# Patient Record
Sex: MALE | Race: OTHER | HISPANIC OR LATINO | Employment: FULL TIME | URBAN - METROPOLITAN AREA
[De-identification: names, ages, dates, MRNs, and addresses within clinical notes are randomized per-mention and may not be internally consistent; named-entity substitution may affect disease eponyms.]

---

## 2017-05-02 ENCOUNTER — GENERIC CONVERSION - ENCOUNTER (OUTPATIENT)
Dept: OTHER | Facility: OTHER | Age: 37
End: 2017-05-02

## 2017-10-03 ENCOUNTER — LAB REQUISITION (OUTPATIENT)
Dept: LAB | Facility: HOSPITAL | Age: 37
End: 2017-10-03
Payer: COMMERCIAL

## 2017-10-03 DIAGNOSIS — R10.9 ABDOMINAL PAIN: ICD-10-CM

## 2017-10-03 PROCEDURE — 83516 IMMUNOASSAY NONANTIBODY: CPT | Performed by: INTERNAL MEDICINE

## 2017-10-05 LAB
TTG IGA SER-ACNC: <2 U/ML (ref 0–3)
TTG IGG SER-ACNC: <2 U/ML (ref 0–5)

## 2017-10-06 ENCOUNTER — GENERIC CONVERSION - ENCOUNTER (OUTPATIENT)
Dept: OTHER | Facility: OTHER | Age: 37
End: 2017-10-06

## 2017-11-01 ENCOUNTER — APPOINTMENT (EMERGENCY)
Dept: RADIOLOGY | Facility: HOSPITAL | Age: 37
End: 2017-11-01
Payer: COMMERCIAL

## 2017-11-01 ENCOUNTER — HOSPITAL ENCOUNTER (EMERGENCY)
Facility: HOSPITAL | Age: 37
Discharge: HOME | End: 2017-11-01
Attending: EMERGENCY MEDICINE | Admitting: EMERGENCY MEDICINE
Payer: COMMERCIAL

## 2017-11-01 VITALS
WEIGHT: 230 LBS | BODY MASS INDEX: 38.32 KG/M2 | OXYGEN SATURATION: 99 % | HEART RATE: 66 BPM | SYSTOLIC BLOOD PRESSURE: 121 MMHG | HEIGHT: 65 IN | DIASTOLIC BLOOD PRESSURE: 72 MMHG | RESPIRATION RATE: 20 BRPM | TEMPERATURE: 98.1 F

## 2017-11-01 DIAGNOSIS — R11.0 NAUSEA: ICD-10-CM

## 2017-11-01 DIAGNOSIS — R10.9 BELLY PAIN: ICD-10-CM

## 2017-11-01 DIAGNOSIS — K85.90 ACUTE PANCREATITIS: Primary | ICD-10-CM

## 2017-11-01 LAB
ALBUMIN SERPL BCP-MCNC: 4 G/DL (ref 3.5–5)
ALP SERPL-CCNC: 46 U/L (ref 46–116)
ALT SERPL W P-5'-P-CCNC: 33 U/L (ref 12–78)
ANION GAP SERPL CALCULATED.3IONS-SCNC: 6 MMOL/L (ref 4–13)
AST SERPL W P-5'-P-CCNC: 27 U/L (ref 5–45)
BASOPHILS # BLD AUTO: 0.01 THOUSANDS/ΜL (ref 0–0.1)
BASOPHILS NFR BLD AUTO: 0 % (ref 0–1)
BILIRUB SERPL-MCNC: 0.4 MG/DL (ref 0.2–1)
BUN SERPL-MCNC: 14 MG/DL (ref 5–25)
CALCIUM SERPL-MCNC: 8.8 MG/DL (ref 8.3–10.1)
CHLORIDE SERPL-SCNC: 106 MMOL/L (ref 100–108)
CO2 SERPL-SCNC: 29 MMOL/L (ref 21–32)
CREAT SERPL-MCNC: 1.02 MG/DL (ref 0.6–1.3)
EOSINOPHIL # BLD AUTO: 0.14 THOUSAND/ΜL (ref 0–0.61)
EOSINOPHIL NFR BLD AUTO: 2 % (ref 0–6)
ERYTHROCYTE [DISTWIDTH] IN BLOOD BY AUTOMATED COUNT: 12.4 % (ref 11.6–15.1)
GFR SERPL CREATININE-BSD FRML MDRD: 94 ML/MIN/1.73SQ M
GLUCOSE SERPL-MCNC: 100 MG/DL (ref 65–140)
HCT VFR BLD AUTO: 41 % (ref 36.5–49.3)
HGB BLD-MCNC: 14 G/DL (ref 12–17)
HOLD SPECIMEN: NORMAL
LIPASE SERPL-CCNC: 1278 U/L (ref 73–393)
LYMPHOCYTES # BLD AUTO: 1.37 THOUSANDS/ΜL (ref 0.6–4.47)
LYMPHOCYTES NFR BLD AUTO: 23 % (ref 14–44)
MCH RBC QN AUTO: 30.4 PG (ref 26.8–34.3)
MCHC RBC AUTO-ENTMCNC: 34.1 G/DL (ref 31.4–37.4)
MCV RBC AUTO: 89 FL (ref 82–98)
MONOCYTES # BLD AUTO: 0.51 THOUSAND/ΜL (ref 0.17–1.22)
MONOCYTES NFR BLD AUTO: 8 % (ref 4–12)
NEUTROPHILS # BLD AUTO: 4.04 THOUSANDS/ΜL (ref 1.85–7.62)
NEUTS SEG NFR BLD AUTO: 67 % (ref 43–75)
NRBC BLD AUTO-RTO: 0 /100 WBCS
PLATELET # BLD AUTO: 211 THOUSANDS/UL (ref 149–390)
PMV BLD AUTO: 9.5 FL (ref 8.9–12.7)
POTASSIUM SERPL-SCNC: 3.3 MMOL/L (ref 3.5–5.3)
PROT SERPL-MCNC: 7.7 G/DL (ref 6.4–8.2)
RBC # BLD AUTO: 4.6 MILLION/UL (ref 3.88–5.62)
SODIUM SERPL-SCNC: 141 MMOL/L (ref 136–145)
WBC # BLD AUTO: 6.08 THOUSAND/UL (ref 4.31–10.16)

## 2017-11-01 PROCEDURE — 80053 COMPREHEN METABOLIC PANEL: CPT | Performed by: EMERGENCY MEDICINE

## 2017-11-01 PROCEDURE — 83690 ASSAY OF LIPASE: CPT | Performed by: EMERGENCY MEDICINE

## 2017-11-01 PROCEDURE — 99284 EMERGENCY DEPT VISIT MOD MDM: CPT

## 2017-11-01 PROCEDURE — 36415 COLL VENOUS BLD VENIPUNCTURE: CPT

## 2017-11-01 PROCEDURE — 96375 TX/PRO/DX INJ NEW DRUG ADDON: CPT

## 2017-11-01 PROCEDURE — 96374 THER/PROPH/DIAG INJ IV PUSH: CPT

## 2017-11-01 PROCEDURE — 85025 COMPLETE CBC W/AUTO DIFF WBC: CPT | Performed by: EMERGENCY MEDICINE

## 2017-11-01 PROCEDURE — 74177 CT ABD & PELVIS W/CONTRAST: CPT

## 2017-11-01 PROCEDURE — 96361 HYDRATE IV INFUSION ADD-ON: CPT

## 2017-11-01 RX ORDER — ACETAMINOPHEN 325 MG/1
650 TABLET ORAL EVERY 6 HOURS PRN
Qty: 30 TABLET | Refills: 0 | Status: ON HOLD | OUTPATIENT
Start: 2017-11-01 | End: 2019-02-25

## 2017-11-01 RX ORDER — KETOROLAC TROMETHAMINE 30 MG/ML
15 INJECTION, SOLUTION INTRAMUSCULAR; INTRAVENOUS ONCE
Status: COMPLETED | OUTPATIENT
Start: 2017-11-01 | End: 2017-11-01

## 2017-11-01 RX ORDER — IBUPROFEN 400 MG/1
400 TABLET ORAL EVERY 6 HOURS PRN
Qty: 30 TABLET | Refills: 0 | Status: SHIPPED | OUTPATIENT
Start: 2017-11-01 | End: 2017-11-08

## 2017-11-01 RX ORDER — ONDANSETRON 4 MG/1
4 TABLET, FILM COATED ORAL EVERY 8 HOURS PRN
Qty: 20 TABLET | Refills: 0 | Status: SHIPPED | OUTPATIENT
Start: 2017-11-01 | End: 2019-11-25 | Stop reason: ALTCHOICE

## 2017-11-01 RX ORDER — MORPHINE SULFATE 10 MG/ML
10 INJECTION, SOLUTION INTRAMUSCULAR; INTRAVENOUS ONCE
Status: COMPLETED | OUTPATIENT
Start: 2017-11-01 | End: 2017-11-01

## 2017-11-01 RX ORDER — MORPHINE SULFATE 15 MG/1
7.5 TABLET ORAL EVERY 6 HOURS PRN
Qty: 10 TABLET | Refills: 0 | Status: SHIPPED | OUTPATIENT
Start: 2017-11-01 | End: 2017-11-03

## 2017-11-01 RX ORDER — METOCLOPRAMIDE HYDROCHLORIDE 5 MG/ML
10 INJECTION INTRAMUSCULAR; INTRAVENOUS ONCE
Status: COMPLETED | OUTPATIENT
Start: 2017-11-01 | End: 2017-11-01

## 2017-11-01 RX ADMIN — SODIUM CHLORIDE 1000 ML: 0.9 INJECTION, SOLUTION INTRAVENOUS at 13:06

## 2017-11-01 RX ADMIN — IOHEXOL 100 ML: 350 INJECTION, SOLUTION INTRAVENOUS at 13:52

## 2017-11-01 RX ADMIN — KETOROLAC TROMETHAMINE 15 MG: 30 INJECTION, SOLUTION INTRAMUSCULAR at 13:10

## 2017-11-01 RX ADMIN — MORPHINE SULFATE 10 MG: 10 INJECTION, SOLUTION INTRAMUSCULAR; INTRAVENOUS at 13:11

## 2017-11-01 RX ADMIN — METOCLOPRAMIDE 10 MG: 5 INJECTION, SOLUTION INTRAMUSCULAR; INTRAVENOUS at 13:07

## 2017-11-01 NOTE — ED PROVIDER NOTES
Final Diagnosis:  1  Acute pancreatitis    2  Belly pain    3  Nausea      Chief Complaint   Patient presents with    Abdominal Pain     Pt c/o abdominal pain and nausea especially for the past 4 days  This is a 40year old male presenting for abdominal pain  For the past 10 years he would have these intermittent episodes of severe abdominal pain that seems "uncomfortable" or "sharp"  It would last sometimes for hours, othertimes days and then resolve  It's associated with nausea  Because of worsening symptoms he eventually followed up with GI who told him likely this represents IBS more than anything else  In the past 4 days though he feels his symptoms are different, much more severe associated with nausea without vomiting  Because of the severity he decided to come in for an evaluation  Denies f/ch   +n without V  Denies CP/SOB  Denies any upper respiratory tract infection symptoms (cough, congestion, rhinorrhea, sore throat)  Denies any urinary tract infection symptoms (burning, itching, pain, blood, frequency)  Denies sick contacts  If it weren't for the severity of nausea with the pain now wrapping around the upper abdomen towards the back he would not be here for an evaluation  No new foods  +food related symptoms   Denies diarrhea, constipation  PMH:  - IBS on no meds besides bentyl PRN  PSH:  - none  Former smoker  No alcohol/drugs  PE:   Vitals:    11/01/17 1046 11/01/17 1125 11/01/17 1315 11/01/17 1403   BP: 159/87 (!) 172/102 138/73 123/68   Pulse: 96 (!) 115 66 64   Resp: 20 (!) 24 20 20   Temp: 98 °F (36 7 °C) 98 1 °F (36 7 °C)     TempSrc: Tympanic Oral     SpO2: 100% 95% 100% 99%   Weight: 76 7 kg (169 lb) 104 kg (230 lb)     Height: 5' 8 75" (1 746 m) 5' 5" (1 651 m)     General: VSS, NAD, awake, alert  Well-nourished, well-developed  Appears stated age  Head: Normocephalic, atraumatic, nontender  Eyes: PERRL, EOM-I  No diplopia  No hyphema     No subconjunctival hemorrhages  Symmetrical lids  ENT: Atraumatic external nose and ears  MMM  No malocclusion  No stridor  Normal phonation  No drooling  Normal swallowing  Neck: Symmetric, trachea midline  No JVD  CV: RRR  +S1/S2  No murmurs or gallops  Peripheral pulses +2 throughout  No chest wall tenderness  Lungs:   Unlabored No retractions  CTAB, lungs sounds equal bilateral    No crepitus  No tachypnea  No paradoxical motion  Abd: +BS, soft  NT abdomen entirely including the RUQ  When I went to evaluate the RUQ for a Ann's sign though  The patient had a very very positive Ann's and started to cry asking "what the hell was that"  ND  No guarding until after Ann's, then guarding RUQ  No rigidity  No rebound  No hepatosplenomegaly noted  No peritoneal signs  Psoas/obturator/heel strike signs are absent  MSK:   FROM   No lower extremity edema  Back:   No CVAT  Skin: Dry, intact  Neuro: AAOx3, GCS 15, CN II-XII grossly intact  Motor grossly intact  Psychiatric/Behavioral: Appropriate mood and affect   Exam: deferred  A:  - Positive Ann's  - Lipase elevation  - Belly pain  - Nausea  P:  - Discussed that I think he has pancreatitis but given the RUQ/Ann's is this gallstone pancreatitis? I want to do a CT scan for his severity of symptoms  - CT given the higher sensitivity for cholecystitis than US (96% vs 94%)  Will also evaluate for pancreatitis severity  - Will do pain control, nausea control, re-evaluate  - I discussed that if he is relatively pain free and normal GB, I'd be okay with DC home and NPO --> CLD --> supportive care  - 13 point ROS was performed and all are normal unless stated in the history above  - Nursing note reviewed  Vitals reviewed  - Orders placed by myself and/or advanced practitioner / resident     - Previous chart was reviewed  - No language barrier    - History obtained from patient  - There are no limitations to the history obtained     - Critical care time: Not applicable for this patient  ED Course as of Nov 01 1504   Wed Nov 01, 2017   1235 Lipase: (!) 1,278   1446 Potassium: (!) 3 3   1446 CT is benign without any GB findings or marked pancreatic abnormalities despite lipase elevation  Will discuss DC vs  Admission for NPO/fluids vs  NPO/CLD/regular diet over next few days  Whatever patient prefers  1502 I reviewed all testing with the patient  I gave oral return precautions for what to return for in addition to the written return precautions  The patient (and any family present) verbalized understanding of the discharge instructions and warnings that would necessitate return to the Emergency Department  I specifically highlighted areas of special concern regarding the written and verbal discharge instructions and return precautions  All questions were answered prior to discharge  Medications   morphine (PF) 10 mg/mL injection 10 mg (10 mg Intravenous Given 11/1/17 1311)   ketorolac (TORADOL) 30 mg/mL injection 15 mg (15 mg Intravenous Given 11/1/17 1310)   sodium chloride 0 9 % bolus 1,000 mL (1,000 mL Intravenous New Bag 11/1/17 1306)   metoclopramide (REGLAN) injection 10 mg (10 mg Intravenous Given 11/1/17 1307)   iohexol (OMNIPAQUE) 350 MG/ML injection (MULTI-DOSE) 100 mL (100 mL Intravenous Given 11/1/17 1352)     CT abdomen pelvis with contrast   ED Interpretation   CT ordered by myself and the report from radiologist has been reviewed  Final Result      No acute abnormality evident  Pancreas appears within normal limits  Subcentimeter hypoattenuating nodule within the liver statistically most likely representing benign finding           Workstation performed: [id number]           Orders Placed This Encounter   Procedures    CT abdomen pelvis with contrast    CBC and differential    Comprehensive metabolic panel    Lipase    POCT urinalysis dipstick    Insert peripheral IV     Labs Reviewed   COMPREHENSIVE METABOLIC PANEL - Abnormal        Result Value Ref Range Status    Potassium 3 3 (*) 3 5 - 5 3 mmol/L Final    Sodium 141  136 - 145 mmol/L Final    Chloride 106  100 - 108 mmol/L Final    CO2 29  21 - 32 mmol/L Final    Anion Gap 6  4 - 13 mmol/L Final    BUN 14  5 - 25 mg/dL Final    Creatinine 1 02  0 60 - 1 30 mg/dL Final    Comment: Standardized to IDMS reference method    Glucose 100  65 - 140 mg/dL Final    Comment:   If the patient is fasting, the ADA then defines impaired fasting glucose as > 100 mg/dL and diabetes as > or equal to 123 mg/dL  Specimen collection should occur prior to Sulfasalazine administration due to the potential for falsely depressed results  Specimen collection should occur prior to Sulfapyridine administration due to the potential for falsely elevated results  Calcium 8 8  8 3 - 10 1 mg/dL Final    AST 27  5 - 45 U/L Final    Comment:   Specimen collection should occur prior to Sulfasalazine administration due to the potential for falsely depressed results  ALT 33  12 - 78 U/L Final    Comment:   Specimen collection should occur prior to Sulfasalazine and/or Sulfapyridine administration due to the potential for falsely depressed results  Alkaline Phosphatase 46  46 - 116 U/L Final    Total Protein 7 7  6 4 - 8 2 g/dL Final    Albumin 4 0  3 5 - 5 0 g/dL Final    Total Bilirubin 0 40  0 20 - 1 00 mg/dL Final    eGFR 94  ml/min/1 73sq m Final    Narrative:     National Kidney Disease Education Program recommendations are as follows:  GFR calculation is accurate only with a steady state creatinine  Chronic Kidney disease less than 60 ml/min/1 73 sq  meters  Kidney failure less than 15 ml/min/1 73 sq  meters     LIPASE - Abnormal     Lipase 1,278 (*) 73 - 393 u/L Final   CBC AND DIFFERENTIAL - Normal    WBC 6 08  4 31 - 10 16 Thousand/uL Final    RBC 4 60  3 88 - 5 62 Million/uL Final    Hemoglobin 14 0  12 0 - 17 0 g/dL Final    Hematocrit 41 0  36 5 - 49 3 % Final    MCV 89 82 - 98 fL Final    MCH 30 4  26 8 - 34 3 pg Final    MCHC 34 1  31 4 - 37 4 g/dL Final    RDW 12 4  11 6 - 15 1 % Final    MPV 9 5  8 9 - 12 7 fL Final    Platelets 785  059 - 390 Thousands/uL Final    nRBC 0  /100 WBCs Final    Neutrophils Relative 67  43 - 75 % Final    Lymphocytes Relative 23  14 - 44 % Final    Monocytes Relative 8  4 - 12 % Final    Eosinophils Relative 2  0 - 6 % Final    Basophils Relative 0  0 - 1 % Final    Neutrophils Absolute 4 04  1 85 - 7 62 Thousands/µL Final    Lymphocytes Absolute 1 37  0 60 - 4 47 Thousands/µL Final    Monocytes Absolute 0 51  0 17 - 1 22 Thousand/µL Final    Eosinophils Absolute 0 14  0 00 - 0 61 Thousand/µL Final    Basophils Absolute 0 01  0 00 - 0 10 Thousands/µL Final   POCT URINALYSIS DIPSTICK   LIGHT BLUE TOP   LAVENDER TOP 7 ML ON HOLD    Extra Tube Hold for add-ons  Final    Comment: Auto resulted  GOLD TOP ON HOLD     Time reflects when diagnosis was documented in both MDM as applicable and the Disposition within this note     Time User Action Codes Description Comment    11/1/2017  2:59 PM [place] Jessica Add [K85 90] Acute pancreatitis     11/1/2017  2:59 PM Jamal Jessica Add [R10 9] Belly pain     11/1/2017  2:59 PM [place] Jessica Add [R11 0] Nausea       ED Disposition     ED Disposition Condition Comment    Discharge  Sierra López discharge to home/self care      Condition at discharge: Good        Follow-up Information     Follow up With Specialties Details Why Contact Info Additional [address] Emergency Department Emergency Medicine Go to If symptoms worsen [address]  [phone]  ED, [address] [place] [address], [address] Shay Aponte MD Family Medicine Call in 1 day To make appointment for re-evaluation in 1 week [address]  [phone]           Patient's Medications   Discharge Prescriptions    ACETAMINOPHEN (TYLENOL) 325 MG TABLET    Take 2 tablets by mouth every 6 (six) hours as needed for mild pain or fever       Start Date: 11/1/2017 End Date: --       Order Dose: 650 mg       Quantity: 30 tablet    Refills: 0    IBUPROFEN (MOTRIN) 400 MG TABLET    Take 1 tablet by mouth every 6 (six) hours as needed for mild pain for up to 7 days       Start Date: 11/1/2017 End Date: 11/8/2017       Order Dose: 400 mg       Quantity: 30 tablet    Refills: 0    MORPHINE (MSIR) 15 MG TABLET    Take 0 5 tablets by mouth every 6 (six) hours as needed for severe pain (No driving while using ) for up to 2 days Max Daily Amount: 30 mg       Start Date: 11/1/2017 End Date: 11/3/2017       Order Dose: 7 5 mg       Quantity: 10 tablet    Refills: 0    ONDANSETRON (ZOFRAN) 4 MG TABLET    Take 1 tablet by mouth every 8 (eight) hours as needed for nausea or vomiting       Start Date: 11/1/2017 End Date: --       Order Dose: 4 mg       Quantity: 20 tablet    Refills: 0     No discharge procedures on file  None       Portions of the record may have been created with voice recognition software  Occasional wrong word or "sound a like" substitutions may have occurred due to the inherent limitations of voice recognition software  Read the chart carefully and recognize, using context, where substitutions have occurred      Electronically signed by:  Shelah Leyden, MD  11/01/17 7808

## 2017-11-01 NOTE — DISCHARGE INSTRUCTIONS
Please follow-up with your GI doctor as we discussed  Please call your PCP to get re-evaluate in 2-4 days  If you have significant worsening of symptoms though, please come back to the ER for ree-valuation    I would be without food for today (NPO)  Tomorrow, advanced your diet to just clear liquid diet  The day after, advance to normal diet    Use tylenol/motrin for pain  Use zofran for nausea  Morphine for break through pain  No driving while using morphine  Pancreatitis   WHAT YOU NEED TO KNOW:   Pancreatitis is inflammation of your pancreas  The pancreas is an organ that makes insulin  It also makes enzymes (digestive juices) that help your body digest food  Pancreatitis may be an acute (short-term) problem that happens only once  It may become a chronic (long-term) problem that comes and goes over time  DISCHARGE INSTRUCTIONS:   Return to the emergency department if:   · You have severe pain in your abdomen and you are vomiting  Contact your healthcare provider if:   · You have a fever  · You continue to lose weight  · Your skin or the whites of your eyes turn yellow  · You have questions or concerns about your condition or care  Medicines: You may need any of the following:  · Antibiotics  treat a bacterial infection  · Prescription pain medicine  may be given  Ask your healthcare provider how to take this medicine safely  Some prescription pain medicines contain acetaminophen  Do not take other medicines that contain acetaminophen without talking to your healthcare provider  Too much acetaminophen may cause liver damage  Prescription pain medicine may cause constipation  Ask your healthcare provider how to prevent or treat constipation  · Take your medicine as directed  Contact your healthcare provider if you think your medicine is not helping or if you have side effects  Tell him or her if you are allergic to any medicine   Keep a list of the medicines, vitamins, and herbs you take  Include the amounts, and when and why you take them  Bring the list or the pill bottles to follow-up visits  Carry your medicine list with you in case of an emergency  Self-care:   · Rest  when you feel it is needed  Slowly start to do more each day  Return to your usual activities as directed  · Do not drink any alcohol  If you need help to stop drinking, contact the following organization:   [employer]  Web Address: http://[employer]/      · Ask your healthcare provider or dietitian about the best foods to eat  You may need to eat foods that are low in fat if you have chronic pancreatitis  Follow up with your healthcare provider as directed:  Write down your questions so you remember to ask them during your visits  © 2017 [address] Information is for End User's use only and may not be sold, redistributed or otherwise used for commercial purposes  All illustrations and images included in CareNotes® are the copyrighted property of A D A M , Inc  or Ravi Rocha  The above information is an  only  It is not intended as medical advice for individual conditions or treatments  Talk to your doctor, nurse or pharmacist before following any medical regimen to see if it is safe and effective for you  Acute Nausea and Vomiting   WHAT YOU NEED TO KNOW:   Acute nausea and vomiting start suddenly, worsen quickly, and last a short time  DISCHARGE INSTRUCTIONS:   Return to the emergency department if:   · You see blood in your vomit or your bowel movements  · You have sudden, severe pain in your chest and upper abdomen after hard vomiting or retching  · You have swelling in your neck and chest      · You are dizzy, cold, and thirsty and your eyes and mouth are dry  · You are urinating very little or not at all  · You have muscle weakness, leg cramps, and trouble breathing       · Your heart is beating much faster than normal  · You continue to vomit for more than 48 hours  Contact your healthcare provider if:   · You have frequent dry heaves (vomiting but nothing comes out)  · Your nausea and vomiting does not get better or go away after you use medicine  · You have questions or concerns about your condition or treatment  Medicines: You may need any of the following:  · Medicines  may be given to calm your stomach and stop your vomiting  You may also need medicines to help you feel more relaxed or to stop nausea and vomiting caused by motion sickness  · Gastrointestinal stimulants  are used to help empty your stomach and bowels  This may help decrease nausea and vomiting  · Take your medicine as directed  Contact your healthcare provider if you think your medicine is not helping or if you have side effects  Tell him or her if you are allergic to any medicine  Keep a list of the medicines, vitamins, and herbs you take  Include the amounts, and when and why you take them  Bring the list or the pill bottles to follow-up visits  Carry your medicine list with you in case of an emergency  Prevent or manage acute nausea and vomiting:   · Do not drink alcohol  Alcohol may upset or irritate your stomach  Too much alcohol can also cause acute nausea and vomiting  · Control stress  Headaches due to stress may cause nausea and vomiting  Find ways to relax and manage your stress  Get more rest and sleep  · Drink more liquids as directed  Vomiting can lead to dehydration  It is important to drink more liquids to help replace lost body fluids  Ask your healthcare provider how much liquid to drink each day and which liquids are best for you  Your provider may recommend that you drink an oral rehydration solution (ORS)  ORS contains water, salts, and sugar that are needed to replace the lost body fluids  Ask what kind of ORS to use, how much to drink, and where to get it  · Eat smaller meals, more often    Eat small amounts of food every 2 to 3 hours, even if you are not hungry  Food in your stomach may decrease your nausea  · Talk to your healthcare provider before you take over-the-counter (OTC) medicines  These medicines can cause serious problems if you use certain other medicines, or you have a medical condition  You may have problems if you use too much or use them for longer than the label says  Follow directions on the label carefully  Follow up with your healthcare provider as directed:  Write down your questions so you remember to ask them during your follow-up visits  © 2017 [address] [employer] Information is for End User's use only and may not be sold, redistributed or otherwise used for commercial purposes  All illustrations and images included in CareNotes® are the copyrighted property of A D A M , Inc  or Ravi Rocha  The above information is an  only  It is not intended as medical advice for individual conditions or treatments  Talk to your doctor, nurse or pharmacist before following any medical regimen to see if it is safe and effective for you

## 2017-11-21 ENCOUNTER — ALLSCRIPTS OFFICE VISIT (OUTPATIENT)
Dept: OTHER | Facility: OTHER | Age: 37
End: 2017-11-21

## 2017-11-21 ENCOUNTER — APPOINTMENT (OUTPATIENT)
Dept: RADIOLOGY | Facility: OTHER | Age: 37
End: 2017-11-21
Payer: COMMERCIAL

## 2017-11-21 DIAGNOSIS — M54.50 LOW BACK PAIN: ICD-10-CM

## 2017-11-21 DIAGNOSIS — M53.3 SACROCOCCYGEAL DISORDERS, NOT ELSEWHERE CLASSIFIED: ICD-10-CM

## 2017-11-21 DIAGNOSIS — M12.88 OTHER SPECIFIC ARTHROPATHIES, NOT ELSEWHERE CLASSIFIED, OTHER SPECIFIED SITE: ICD-10-CM

## 2017-11-21 DIAGNOSIS — M51.37 OTHER INTERVERTEBRAL DISC DEGENERATION, LUMBOSACRAL REGION: ICD-10-CM

## 2017-11-21 PROCEDURE — 72114 X-RAY EXAM L-S SPINE BENDING: CPT

## 2017-12-01 ENCOUNTER — APPOINTMENT (OUTPATIENT)
Dept: PHYSICAL THERAPY | Facility: OTHER | Age: 37
End: 2017-12-01
Payer: COMMERCIAL

## 2017-12-01 PROCEDURE — G8994 SUB PT/OT GOAL STATUS: HCPCS

## 2017-12-01 PROCEDURE — 97161 PT EVAL LOW COMPLEX 20 MIN: CPT

## 2017-12-01 PROCEDURE — G8993 SUB PT/OT CURRENT STATUS: HCPCS

## 2017-12-06 ENCOUNTER — APPOINTMENT (OUTPATIENT)
Dept: PHYSICAL THERAPY | Facility: OTHER | Age: 37
End: 2017-12-06
Payer: COMMERCIAL

## 2017-12-08 ENCOUNTER — APPOINTMENT (OUTPATIENT)
Dept: PHYSICAL THERAPY | Facility: OTHER | Age: 37
End: 2017-12-08
Payer: COMMERCIAL

## 2017-12-13 ENCOUNTER — GENERIC CONVERSION - ENCOUNTER (OUTPATIENT)
Dept: FAMILY MEDICINE CLINIC | Facility: CLINIC | Age: 37
End: 2017-12-13

## 2017-12-14 ENCOUNTER — APPOINTMENT (OUTPATIENT)
Dept: PHYSICAL THERAPY | Facility: OTHER | Age: 37
End: 2017-12-14
Payer: COMMERCIAL

## 2017-12-14 PROCEDURE — 97140 MANUAL THERAPY 1/> REGIONS: CPT

## 2017-12-19 ENCOUNTER — APPOINTMENT (OUTPATIENT)
Dept: PHYSICAL THERAPY | Facility: OTHER | Age: 37
End: 2017-12-19
Payer: COMMERCIAL

## 2017-12-19 PROCEDURE — 97140 MANUAL THERAPY 1/> REGIONS: CPT

## 2017-12-21 ENCOUNTER — APPOINTMENT (OUTPATIENT)
Dept: PHYSICAL THERAPY | Facility: OTHER | Age: 37
End: 2017-12-21
Payer: COMMERCIAL

## 2017-12-26 ENCOUNTER — APPOINTMENT (OUTPATIENT)
Dept: PHYSICAL THERAPY | Facility: OTHER | Age: 37
End: 2017-12-26
Payer: COMMERCIAL

## 2017-12-28 ENCOUNTER — APPOINTMENT (OUTPATIENT)
Dept: PHYSICAL THERAPY | Facility: OTHER | Age: 37
End: 2017-12-28
Payer: COMMERCIAL

## 2017-12-28 PROCEDURE — 97140 MANUAL THERAPY 1/> REGIONS: CPT

## 2018-01-02 ENCOUNTER — APPOINTMENT (OUTPATIENT)
Dept: PHYSICAL THERAPY | Facility: OTHER | Age: 38
End: 2018-01-02
Payer: COMMERCIAL

## 2018-01-04 ENCOUNTER — APPOINTMENT (OUTPATIENT)
Dept: PHYSICAL THERAPY | Facility: OTHER | Age: 38
End: 2018-01-04
Payer: COMMERCIAL

## 2018-01-11 ENCOUNTER — APPOINTMENT (OUTPATIENT)
Dept: PHYSICAL THERAPY | Facility: OTHER | Age: 38
End: 2018-01-11
Payer: COMMERCIAL

## 2018-01-11 PROCEDURE — 97140 MANUAL THERAPY 1/> REGIONS: CPT

## 2018-01-12 NOTE — MISCELLANEOUS
Message  Return to work or school:   Esther Martinez is under my professional care  He was seen in my office on 11/21/2017       Continue her work activities without any restrictions  Nic CHAVEZ        Signatures   Electronically signed by : Nic Pryor MD; Nov 25 2017  3:00PM EST                       (Author)

## 2018-01-13 VITALS
HEART RATE: 76 BPM | SYSTOLIC BLOOD PRESSURE: 133 MMHG | HEIGHT: 68 IN | BODY MASS INDEX: 26.1 KG/M2 | WEIGHT: 172.18 LBS | DIASTOLIC BLOOD PRESSURE: 71 MMHG

## 2018-01-16 ENCOUNTER — APPOINTMENT (OUTPATIENT)
Dept: PHYSICAL THERAPY | Facility: OTHER | Age: 38
End: 2018-01-16
Payer: COMMERCIAL

## 2018-01-18 ENCOUNTER — APPOINTMENT (OUTPATIENT)
Dept: PHYSICAL THERAPY | Facility: OTHER | Age: 38
End: 2018-01-18
Payer: COMMERCIAL

## 2018-01-23 NOTE — RESULT NOTES
Verified Results  XR SPINE LUMBAR COMPLETE Korina Cline MINIMUM 6 VIEWS [id number] 08:40AM Kevin Liter Order Number: [id number]   Performing Comments: w/ flex ext please     Test Name Result Flag Reference   XR SPINE LUMBAR COMPLETE W BENDING MINIMUM 6 VIEWS (Report)     LUMBAR SPINE     INDICATION: Low back pain  No history of injury  COMPARISON: None     VIEWS: AP, bilateral oblique, coned down and lateral projections in neutral, flexion and extension     IMAGES: 6     FINDINGS:     Minimal lumbar scoliosis noted convex to the left centered at L2-L3  Alignment is otherwise unremarkable  There is no subluxation and alignment is stable in flexion, extension, and neutral positioning  There is no radiographic evidence of acute fracture or destructive osseous lesion  No significant lumbar degenerative change noted  IMPRESSION:     Unremarkable lumbar spine exam        Workstation performed: [id number]     Signed by:   Nani Elias MD   11/25/17       Plan  Chronic bilateral low back pain without sciatica    · * XR SPINE LUMBAR MINIMUM 4 VIEWS NON INJURY; Status:Active; Requested  for:21Nov2017;   Chronic bilateral low back pain without sciatica, DDD (degenerative disc disease),  lumbosacral, Facet arthropathy, lumbosacral, Pain of right sacroiliac joint    · *1 - [facility]  *Pleasant 81-year-old male with chronic low  back pain found to have lumbosacral facet arthropathy, mild L4/L5 and L5/S1 DDD, right  SI joint tenderness, and right greater trochanteric bursitis  Please evaluate and treat for  the above with focus on paralumbar muscle strengthening, core muscle strengthening,  hip abductor/adductor, gluteal strengthening, and functional rehabilitation 2-3 times a  week 6 weeks  Use all modalities as needed during physical therapy  sessions and daily program ASAP    Status: Active  Requested for: 21Nov2017  Care Summary provided  : Yes  Pain of right sacroiliac joint    · PredniSONE 10 MG Oral Tablet; TAKE 1 TABLET DAILY   · Follow-up visit in 6 weeks Evaluation and Treatment  Follow-up  Status: Complete  Done:  [id number]

## 2018-02-08 ENCOUNTER — APPOINTMENT (OUTPATIENT)
Dept: PHYSICAL THERAPY | Facility: OTHER | Age: 38
End: 2018-02-08
Payer: COMMERCIAL

## 2018-02-13 ENCOUNTER — EVALUATION (OUTPATIENT)
Dept: PHYSICAL THERAPY | Facility: OTHER | Age: 38
End: 2018-02-13
Payer: COMMERCIAL

## 2018-02-13 DIAGNOSIS — S33.5XXD LUMBAR SPRAIN, SUBSEQUENT ENCOUNTER: Primary | ICD-10-CM

## 2018-02-13 PROCEDURE — 97110 THERAPEUTIC EXERCISES: CPT | Performed by: PHYSICAL THERAPIST

## 2018-02-13 NOTE — PROGRESS NOTES
Daily Note     Today's date: 2018  Patient name: Martin Polanco  : 1980  MRN: [id number]  Referring provider: Michael Morales MD  Dx:   Encounter Diagnosis   Name Primary?  Lumbar sprain, subsequent encounter Yes                  Subjective: RA today performed all special test for R sided SIJ now negative  No merari nwith AROM of the LBP  No longer has hip mms tightness now  No LLD  He reported only 1 episode of LBP since a month ago while he was walking for greater than 3 hours  This pain did resolve quickly  today we reviewed HEP and POC he will continue with his exercises at home and contact PT if his pain returns  Patient is please with outcomes at this time  Objective: See treatment diary below    Reviewed HEP and performed warmup only today  Assessment: Tolerated treatment well  Patient demonstrated fatigue post treatment and exhibited good technique with therapeutic exercises      Plan: Continue per plan of care

## 2018-12-11 ENCOUNTER — OFFICE VISIT (OUTPATIENT)
Dept: GASTROENTEROLOGY | Facility: CLINIC | Age: 38
End: 2018-12-11
Payer: COMMERCIAL

## 2018-12-11 VITALS
WEIGHT: 170.4 LBS | HEART RATE: 79 BPM | DIASTOLIC BLOOD PRESSURE: 91 MMHG | TEMPERATURE: 98.2 F | SYSTOLIC BLOOD PRESSURE: 147 MMHG | HEIGHT: 69 IN | BODY MASS INDEX: 25.24 KG/M2

## 2018-12-11 DIAGNOSIS — R14.0 ABDOMINAL BLOATING: ICD-10-CM

## 2018-12-11 DIAGNOSIS — R10.30 LOWER ABDOMINAL PAIN: Primary | ICD-10-CM

## 2018-12-11 DIAGNOSIS — R19.4 CHANGE IN BOWEL HABIT: ICD-10-CM

## 2018-12-11 PROCEDURE — 99204 OFFICE O/P NEW MOD 45 MIN: CPT | Performed by: INTERNAL MEDICINE

## 2018-12-11 NOTE — PATIENT INSTRUCTIONS
EDG Colon scheduled 1/28/19 at [facility] with Dr Fernanda Castrejon rep instructions given in office

## 2018-12-11 NOTE — LETTER
December 11, 7701     Hugo Castillo, [address] [facility]    Patient: Modesto Mitchell   YOB: 1980   Date of Visit: 12/11/2018       Dear Dr Solano Males: Thank you for referring Estella Collado to me for evaluation  Below are my notes for this consultation  If you have questions, please do not hesitate to call me  I look forward to following your patient along with you  Sincerely,        Irwin Garcia MD        CC: No Recipients  Irwin Garcia MD  12/11/2018  2:13 PM  Sign at close encounter  [facility] - Outpatient Consultation  Modesto Mitchell 41 y o  male MRN: [id number]  Encounter: [id number]          ASSESSMENT AND PLAN:      Abdominal pain and change in bowel habit -this is likely secondary to her irritable bowel syndrome but because of recent worsening of symptoms, I recommend EGD and colonoscopy for further evaluation  Continue probiotics  I advised to eat low FODMAP diet  He had prior history of elevated lipase and abdominal pain but CT scan at that time was negative for pancreatitis  This was likely secondary to food poisoning/gastroenteritis  Will order ultrasound to rule out cholelithiasis  Risks and benefits of the procedure were discussed  Risks include but aren't limited to bleeding, perforation, missed lesion, and infection  Patient is agreeable to procedure  Bowel prep instructions given  Follow-up with PA in 3 months  ______________________________________________________________________    HPI:  Modesto Mitchell is a 41 y o  male here today for IBS evaluation and to obtain a second medical opinion  He has dealt with chronic abdominal issues such as severe constipation throughout his life but his symptoms are progressively worsening over time  His symptoms today include epigastric pain that radiates to the right flank and diarrhea  He had constipation in the past   He has a BM at least 3 times a day   He reports some nocturnal symptomatology  He takes Metamucil and a probiotic daily  He adheres to a healthy diet and does not drink or smoke  He has a family history of polyps in his older brother  He has never had a colonoscopy  He had prior history of elevated lipase and abdominal pain but CT scan at that time was negative for pancreatitis  This was likely secondary to food poisoning/gastroenteritis  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:  Requiring colostomy for 6 months    REVIEW OF SYSTEMS:    CONSTITUTIONAL: Denies any fever, chills, rigors, and weight loss  HEENT: No earache or tinnitus  Denies hearing loss or visual disturbances  CARDIOVASCULAR: No chest pain or palpitations  RESPIRATORY: Denies any cough, hemoptysis, shortness of breath or dyspnea on exertion  GASTROINTESTINAL: As noted in the History of Present Illness  GENITOURINARY: No problems with urination  Denies any hematuria or dysuria  NEUROLOGIC: No dizziness or vertigo, denies headaches  MUSCULOSKELETAL: Denies any muscle or joint pain  SKIN: Denies skin rashes or itching  ENDOCRINE: Denies excessive thirst  Denies intolerance to heat or cold  PSYCHOSOCIAL: Denies depression or anxiety  Denies any recent memory loss  Historical Information   Past Medical History:   Diagnosis Date    IBS (irritable bowel syndrome)      Past Surgical History:   Procedure Laterality Date    ABDOMINAL SURGERY       Social History   History   Alcohol Use    Yes     Comment: rare     History   Drug Use    Types: Marijuana     History   Smoking Status    Former Smoker   Smokeless Tobacco    Never Used     History reviewed  No pertinent family history      Meds/Allergies       Current Outpatient Prescriptions:     acetaminophen (TYLENOL) 325 mg tablet    ondansetron (ZOFRAN) 4 mg tablet    ibuprofen (MOTRIN) 400 mg tablet    No Known Allergies        Objective     Blood pressure 147/91, pulse 79, temperature 98 2 °F (36 8 °C), temperature source Tympanic, height 5' 9" (1 753 m), weight 77 3 kg (170 lb 6 4 oz)  Body mass index is 25 16 kg/m²  PHYSICAL EXAM:      General Appearance:   Alert, cooperative, no distress   HEENT:   Normocephalic, atraumatic, anicteric      Neck:  Supple, symmetrical, trachea midline   Lungs:   Clear to auscultation bilaterally; no rales, rhonchi or wheezing; respirations unlabored    Heart[de-identified]   Regular rate and rhythm; no murmur, rub, or gallop  Abdomen:   Soft, midline scar, non-tender, non-distended; normal bowel sounds; no masses, no organomegaly    Genitalia:   Deferred    Rectal:   Deferred    Extremities:  No cyanosis, clubbing or edema    Pulses:  2+ and symmetric    Skin:  No jaundice, rashes, or lesions    Lymph nodes:  No palpable cervical lymphadenopathy        Lab Results:   No visits with results within 1 Day(s) from this visit     Latest known visit with results is:   Admission on 11/01/2017, Discharged on 11/01/2017   Component Date Value    WBC 11/01/2017 6 08     RBC 11/01/2017 4 60     Hemoglobin 11/01/2017 14 0     Hematocrit 11/01/2017 41 0     MCV 11/01/2017 89     MCH 11/01/2017 30 4     MCHC 11/01/2017 34 1     RDW 11/01/2017 12 4     MPV 11/01/2017 9 5     Platelets 33/18/8187 211     nRBC 11/01/2017 0     Neutrophils Relative 11/01/2017 67     Lymphocytes Relative 11/01/2017 23     Monocytes Relative 11/01/2017 8     Eosinophils Relative 11/01/2017 2     Basophils Relative 11/01/2017 0     Neutrophils Absolute 11/01/2017 4 04     Lymphocytes Absolute 11/01/2017 1 37     Monocytes Absolute 11/01/2017 0 51     Eosinophils Absolute 11/01/2017 0 14     Basophils Absolute 11/01/2017 0 01     Sodium 11/01/2017 141     Potassium 11/01/2017 3 3*    Chloride 11/01/2017 106     CO2 11/01/2017 29     ANION GAP 11/01/2017 6     BUN 11/01/2017 14     Creatinine 11/01/2017 1 02     Glucose 11/01/2017 100     Calcium 11/01/2017 8 8     AST 11/01/2017 27     ALT 11/01/2017 33     Alkaline Phosphatase 11/01/2017 46     Total Protein 11/01/2017 7 7     Albumin 11/01/2017 4 0     Total Bilirubin 11/01/2017 0 40     eGFR 11/01/2017 94     Lipase 11/01/2017 1278*    Extra Tube 11/01/2017 Hold for add-ons  Radiology Results:   No results found  Attestation:   By signing my name below, Sarai Patel, attest that this documentation has been prepared under the direction and in the presence of Barrington Velasquez MD  Electronically Signed: Jacquelin Wells  12/11/18      I, Barrington Velasquez, personally performed the services described in this documentation  All medical record entries made by the scribe were at my direction and in my presence  I have reviewed the chart and discharge instructions and agree that the record reflects my personal performance and is accurate and complete   Barrington Velasquez MD  12/11/18

## 2018-12-11 NOTE — PROGRESS NOTES
[facility] - Outpatient Consultation  Radha Tyson 41 y o  male MRN: [id number]  Encounter: [id number]          ASSESSMENT AND PLAN:      Abdominal pain and change in bowel habit -this is likely secondary to her irritable bowel syndrome but because of recent worsening of symptoms, I recommend EGD and colonoscopy for further evaluation  Continue probiotics  I advised to eat low FODMAP diet  He had prior history of elevated lipase and abdominal pain but CT scan at that time was negative for pancreatitis  This was likely secondary to food poisoning/gastroenteritis  Will order ultrasound to rule out cholelithiasis  Risks and benefits of the procedure were discussed  Risks include but aren't limited to bleeding, perforation, missed lesion, and infection  Patient is agreeable to procedure  Bowel prep instructions given  Follow-up with PA in 3 months  ______________________________________________________________________    HPI:  Radha Tyson is a 41 y o  male here today for IBS evaluation and to obtain a second medical opinion  He has dealt with chronic abdominal issues such as severe constipation throughout his life but his symptoms are progressively worsening over time  His symptoms today include epigastric pain that radiates to the right flank and diarrhea  He had constipation in the past   He has a BM at least 3 times a day  He reports some nocturnal symptomatology  He takes Metamucil and a probiotic daily  He adheres to a healthy diet and does not drink or smoke  He has a family history of polyps in his older brother  He has never had a colonoscopy  He had prior history of elevated lipase and abdominal pain but CT scan at that time was negative for pancreatitis  This was likely secondary to food poisoning/gastroenteritis    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:  Requiring colostomy for 6 months    REVIEW OF SYSTEMS:    CONSTITUTIONAL: Denies any fever, chills, rigors, and weight loss  HEENT: No earache or tinnitus  Denies hearing loss or visual disturbances  CARDIOVASCULAR: No chest pain or palpitations  RESPIRATORY: Denies any cough, hemoptysis, shortness of breath or dyspnea on exertion  GASTROINTESTINAL: As noted in the History of Present Illness  GENITOURINARY: No problems with urination  Denies any hematuria or dysuria  NEUROLOGIC: No dizziness or vertigo, denies headaches  MUSCULOSKELETAL: Denies any muscle or joint pain  SKIN: Denies skin rashes or itching  ENDOCRINE: Denies excessive thirst  Denies intolerance to heat or cold  PSYCHOSOCIAL: Denies depression or anxiety  Denies any recent memory loss  Historical Information   Past Medical History:   Diagnosis Date    IBS (irritable bowel syndrome)      Past Surgical History:   Procedure Laterality Date    ABDOMINAL SURGERY       Social History   History   Alcohol Use    Yes     Comment: rare     History   Drug Use    Types: Marijuana     History   Smoking Status    Former Smoker   Smokeless Tobacco    Never Used     History reviewed  No pertinent family history  Meds/Allergies       Current Outpatient Prescriptions:     acetaminophen (TYLENOL) 325 mg tablet    ondansetron (ZOFRAN) 4 mg tablet    ibuprofen (MOTRIN) 400 mg tablet    No Known Allergies        Objective     Blood pressure 147/91, pulse 79, temperature 98 2 °F (36 8 °C), temperature source Tympanic, height 5' 9" (1 753 m), weight 77 3 kg (170 lb 6 4 oz)  Body mass index is 25 16 kg/m²  PHYSICAL EXAM:      General Appearance:   Alert, cooperative, no distress   HEENT:   Normocephalic, atraumatic, anicteric      Neck:  Supple, symmetrical, trachea midline   Lungs:   Clear to auscultation bilaterally; no rales, rhonchi or wheezing; respirations unlabored    Heart[de-identified]   Regular rate and rhythm; no murmur, rub, or gallop     Abdomen:   Soft, midline scar, non-tender, non-distended; normal bowel sounds; no masses, no organomegaly    Genitalia:   Deferred    Rectal:   Deferred    Extremities:  No cyanosis, clubbing or edema    Pulses:  2+ and symmetric    Skin:  No jaundice, rashes, or lesions    Lymph nodes:  No palpable cervical lymphadenopathy        Lab Results:   No visits with results within 1 Day(s) from this visit  Latest known visit with results is:   Admission on 11/01/2017, Discharged on 11/01/2017   Component Date Value    WBC 11/01/2017 6 08     RBC 11/01/2017 4 60     Hemoglobin 11/01/2017 14 0     Hematocrit 11/01/2017 41 0     MCV 11/01/2017 89     MCH 11/01/2017 30 4     MCHC 11/01/2017 34 1     RDW 11/01/2017 12 4     MPV 11/01/2017 9 5     Platelets 97/53/4385 211     nRBC 11/01/2017 0     Neutrophils Relative 11/01/2017 67     Lymphocytes Relative 11/01/2017 23     Monocytes Relative 11/01/2017 8     Eosinophils Relative 11/01/2017 2     Basophils Relative 11/01/2017 0     Neutrophils Absolute 11/01/2017 4 04     Lymphocytes Absolute 11/01/2017 1 37     Monocytes Absolute 11/01/2017 0 51     Eosinophils Absolute 11/01/2017 0 14     Basophils Absolute 11/01/2017 0 01     Sodium 11/01/2017 141     Potassium 11/01/2017 3 3*    Chloride 11/01/2017 106     CO2 11/01/2017 29     ANION GAP 11/01/2017 6     BUN 11/01/2017 14     Creatinine 11/01/2017 1 02     Glucose 11/01/2017 100     Calcium 11/01/2017 8 8     AST 11/01/2017 27     ALT 11/01/2017 33     Alkaline Phosphatase 11/01/2017 46     Total Protein 11/01/2017 7 7     Albumin 11/01/2017 4 0     Total Bilirubin 11/01/2017 0 40     eGFR 11/01/2017 94     Lipase 11/01/2017 1278*    Extra Tube 11/01/2017 Hold for add-ons  Radiology Results:   No results found        Attestation:   By signing my name below, Darion Hernandez, attest that this documentation has been prepared under the direction and in the presence of Abel Hardy MD  Electronically Signed: Candance Condon Lito Marks  12/11/18      I, Keyonna Acharya, personally performed the services described in this documentation  All medical record entries made by the scribe were at my direction and in my presence  I have reviewed the chart and discharge instructions and agree that the record reflects my personal performance and is accurate and complete   Keyonna Acharya MD  12/11/18

## 2018-12-24 ENCOUNTER — HOSPITAL ENCOUNTER (OUTPATIENT)
Dept: RADIOLOGY | Age: 38
Discharge: HOME | End: 2018-12-24
Attending: INTERNAL MEDICINE
Payer: COMMERCIAL

## 2018-12-24 DIAGNOSIS — R14.0 ABDOMINAL BLOATING: ICD-10-CM

## 2018-12-24 PROCEDURE — 76705 ECHO EXAM OF ABDOMEN: CPT

## 2019-01-14 ENCOUNTER — ANESTHESIA EVENT (OUTPATIENT)
Dept: PERIOP | Facility: AMBULARY SURGERY CENTER | Age: 39
End: 2019-01-14
Payer: COMMERCIAL

## 2019-01-21 ENCOUNTER — TELEPHONE (OUTPATIENT)
Dept: GASTROENTEROLOGY | Facility: CLINIC | Age: 39
End: 2019-01-21

## 2019-01-21 NOTE — TELEPHONE ENCOUNTER
While confirming patient for procedure he told me his prep was over $100, I sent him dulcolax miralax prep to [email or web address]  [email or web address]

## 2019-01-25 ENCOUNTER — TELEPHONE (OUTPATIENT)
Dept: GASTROENTEROLOGY | Facility: MEDICAL CENTER | Age: 39
End: 2019-01-25

## 2019-01-25 NOTE — TELEPHONE ENCOUNTER
Pt called to reschedule procedure he had a death in family, I moved pt to 2/25/19 at [facility] with dr Emely Monahan

## 2019-01-28 ENCOUNTER — ANESTHESIA (OUTPATIENT)
Dept: PERIOP | Facility: AMBULARY SURGERY CENTER | Age: 39
End: 2019-01-28
Payer: COMMERCIAL

## 2019-02-25 ENCOUNTER — HOSPITAL ENCOUNTER (OUTPATIENT)
Facility: AMBULARY SURGERY CENTER | Age: 39
Setting detail: OUTPATIENT SURGERY
Discharge: HOME | End: 2019-02-25
Attending: INTERNAL MEDICINE | Admitting: INTERNAL MEDICINE
Payer: COMMERCIAL

## 2019-02-25 VITALS
BODY MASS INDEX: 25.76 KG/M2 | WEIGHT: 170 LBS | TEMPERATURE: 97.9 F | SYSTOLIC BLOOD PRESSURE: 128 MMHG | HEART RATE: 66 BPM | OXYGEN SATURATION: 100 % | RESPIRATION RATE: 18 BRPM | HEIGHT: 68 IN | DIASTOLIC BLOOD PRESSURE: 75 MMHG

## 2019-02-25 DIAGNOSIS — R10.30 LOWER ABDOMINAL PAIN: ICD-10-CM

## 2019-02-25 DIAGNOSIS — R14.0 ABDOMINAL BLOATING: ICD-10-CM

## 2019-02-25 PROCEDURE — 88305 TISSUE EXAM BY PATHOLOGIST: CPT | Performed by: PATHOLOGY

## 2019-02-25 PROCEDURE — 43239 EGD BIOPSY SINGLE/MULTIPLE: CPT | Performed by: INTERNAL MEDICINE

## 2019-02-25 PROCEDURE — 45380 COLONOSCOPY AND BIOPSY: CPT | Performed by: INTERNAL MEDICINE

## 2019-02-25 RX ORDER — SODIUM CHLORIDE, SODIUM LACTATE, POTASSIUM CHLORIDE, CALCIUM CHLORIDE 600; 310; 30; 20 MG/100ML; MG/100ML; MG/100ML; MG/100ML
INJECTION, SOLUTION INTRAVENOUS CONTINUOUS PRN
Status: DISCONTINUED | OUTPATIENT
Start: 2019-02-25 | End: 2019-02-25 | Stop reason: SURG

## 2019-02-25 RX ORDER — PROPOFOL 10 MG/ML
INJECTION, EMULSION INTRAVENOUS AS NEEDED
Status: DISCONTINUED | OUTPATIENT
Start: 2019-02-25 | End: 2019-02-25 | Stop reason: SURG

## 2019-02-25 RX ORDER — LIDOCAINE HYDROCHLORIDE 10 MG/ML
INJECTION, SOLUTION INFILTRATION; PERINEURAL AS NEEDED
Status: DISCONTINUED | OUTPATIENT
Start: 2019-02-25 | End: 2019-02-25 | Stop reason: SURG

## 2019-02-25 RX ADMIN — PROPOFOL 100 MG: 10 INJECTION, EMULSION INTRAVENOUS at 12:36

## 2019-02-25 RX ADMIN — PROPOFOL 50 MG: 10 INJECTION, EMULSION INTRAVENOUS at 12:45

## 2019-02-25 RX ADMIN — PROPOFOL 50 MG: 10 INJECTION, EMULSION INTRAVENOUS at 12:41

## 2019-02-25 RX ADMIN — SODIUM CHLORIDE, SODIUM LACTATE, POTASSIUM CHLORIDE, AND CALCIUM CHLORIDE: .6; .31; .03; .02 INJECTION, SOLUTION INTRAVENOUS at 12:21

## 2019-02-25 RX ADMIN — LIDOCAINE HYDROCHLORIDE 50 MG: 10 INJECTION, SOLUTION INFILTRATION; PERINEURAL at 12:31

## 2019-02-25 RX ADMIN — PROPOFOL 130 MG: 10 INJECTION, EMULSION INTRAVENOUS at 12:32

## 2019-02-25 NOTE — OP NOTE
OPERATIVE REPORT  PATIENT NAME: Sam Lai    :  1980  MRN: [id number]  Pt Location: [facility]  GI ROOM 01    SURGERY DATE: 2019    Surgeon(s) and Role:     * Nalini Montero MD - Primary    Colonoscopy Procedure Note    Procedure: Colonoscopy    Sedation: Monitored anesthesia care, check anesthesia records      ASA Class: 2    INDICATIONS:  Abdominal pain and change in bowel habit  POST-OP DIAGNOSIS: See the impression below    Procedure Details     Prior colonoscopy: No prior colonoscopy  Informed consent was obtained for the procedure, including sedation  Risks of perforation, hemorrhage, adverse drug reaction and aspiration were discussed  The patient was placed in the left lateral decubitus position  Based on the pre-procedure assessment, including review of the patient's medical history, medications, allergies, and review of systems, he had been deemed to be an appropriate candidate for conscious sedation; he was therefore sedated with the medications listed below  The patient was monitored continuously with telemetry, pulse oximetry, blood pressure monitoring, and direct observations  A rectal examination was performed  The colonoscope was inserted into the rectum and advanced under direct vision to the terminal ileum  The quality of the colonic preparation was good  A careful inspection was made as the colonoscope was withdrawn, including a retroflexed view of the rectum; findings and interventions are described below  Findings:  Normal terminal ileum  Mild diverticulosis in the sigmoid colon  Anastomosis from prior sigmoid surgery noted  The rest of the colon appeared normal   Random biopsies taken to rule out microscopic colitis  The biopsies taken using cold biopsy forceps  Complications: None; patient tolerated the procedure well  Impression:    Mild diverticulosis in the sigmoid colon  Evidence of prior sigmoid resection and anastomosis    Small internal hemorrhoids noted  Random biopsy taken to rule out microscopic colitis    Recommendations:  Repeat colonoscopy in 10 years or sooner if clinically indicated  High-fiber diet  Continue Metamucil  Follow-up biopsy  Follow-up in the office   If you have abdominal pain, bleeding or fever call my office at [phone]  COMPLICATIONS:  None; patient tolerated the procedure well      SPECIMENS:    ID Type Source Tests Collected by Time Destination   1 : duodenum bx Tissue Duodenum TISSUE EXAM Alex Hale MD 2/25/2019 1235    2 : gastric bx Tissue Stomach TISSUE EXAM Alex Hale MD 2/25/2019 1237    3 : random colon bx Tissue Colon TISSUE EXAM Alex Hale MD 2/25/2019 1249        ESTIMATED BLOOD LOSS:  Minimal    SIGNATURE: Alex Hale MD  DATE: February 25, 2019  TIME: 1:00 PM

## 2019-02-25 NOTE — ANESTHESIA POSTPROCEDURE EVALUATION
Post-Op Assessment Note    CV Status:  Stable  Pain Score: 0    Pain management: adequate     Mental Status:  Alert and awake   Hydration Status:  Euvolemic   PONV Controlled:  Controlled   Airway Patency:  Patent   Post Op Vitals Reviewed: Yes      Staff: CRNA           BP      Temp      Pulse     Resp      SpO2

## 2019-02-25 NOTE — H&P
History and Physical -  [facility]  Jane Walter 41 y o  male MRN: [id number]    HPI: Jane Walter is a 41y o  year old male who presents for EGD and colonoscopy for evaluation of abdominal pain and change in bowel habit      Review of Systems    Historical Information   Past Medical History:   Diagnosis Date    IBS (irritable bowel syndrome)      Past Surgical History:   Procedure Laterality Date    ABDOMINAL SURGERY       Social History   Social History     Substance and Sexual Activity   Alcohol Use Yes    Frequency: Monthly or less    Comment: rare     Social History     Substance and Sexual Activity   Drug Use Yes    Types: Marijuana     Social History     Tobacco Use   Smoking Status Former Smoker   Smokeless Tobacco Never Used     History reviewed  No pertinent family history  Meds/Allergies     Medications Prior to Admission   Medication    ibuprofen (MOTRIN) 400 mg tablet    ondansetron (ZOFRAN) 4 mg tablet       No Known Allergies    Objective     Blood pressure 136/82, pulse 73, temperature 98 3 °F (36 8 °C), temperature source Temporal, resp  rate 18, height 5' 8" (1 727 m), weight 77 1 kg (170 lb), SpO2 100 %        PHYSICAL EXAM    Gen: NAD  CV: RRR  CHEST: Clear  ABD: soft, NT/ND  EXT: no edema  Neuro: AAO      ASSESSMENT/PLAN:  This is a 41y o  year old male here for EGD and colonoscopy for evaluation of abdominal pain and change in bowel habit    PLAN:   Procedure:  EGD and colonoscopy    3

## 2019-02-25 NOTE — ANESTHESIA PREPROCEDURE EVALUATION
Review of Systems/Medical History  Patient summary reviewed  Chart reviewed  No history of anesthetic complications     Cardiovascular  No hypertension ,    Pulmonary  No recent URI ,        GI/Hepatic            Endo/Other     GYN       Hematology   Musculoskeletal       Neurology   Psychology           Physical Exam    Airway    Mallampati score: II  TM Distance: >3 FB       Dental       Cardiovascular      Pulmonary      Other Findings      Lab Results   Component Value Date    WBC 6 08 11/01/2017    HGB 14 0 11/01/2017     11/01/2017     Lab Results   Component Value Date    K 3 3 (L) 11/01/2017    BUN 14 11/01/2017    CREATININE 1 02 11/01/2017     No results found for: PTT   No results found for: INR      No results found for: HGBA1C      Anesthesia Plan  ASA Score- 2     Anesthesia Type- IV sedation with anesthesia with ASA Monitors  Additional Monitors:   Airway Plan:     Comment: NBA Willingham , have personally seen and evaluated the patient prior to anesthetic care  I have reviewed the pre-anesthetic record, and other medical records if appropriate to the anesthetic care  If a CRNA is involved in the case, I have reviewed the CRNA assessment, if present, and agree  Risks/benefits and alternatives discussed with patient including possible PONV, sore throat, and possibility of rare anesthetic and surgical emergencies        Plan Factors-    Induction-     Postoperative Plan-     Informed Consent- Anesthetic plan and risks discussed with patient  I personally reviewed this patient with the CRNA  Discussed and agreed on the Anesthesia Plan with the CRNA  Olivia Huerta

## 2019-02-25 NOTE — DISCHARGE INSTRUCTIONS
OPERATIVE REPORT  PATIENT NAME: Rossana Duval    :  1980  MRN: [id number]  Pt Location: [facility]  GI ROOM 01    SURGERY DATE: 2019    Surgeon(s) and Role:     * Latosha Boland MD - Primary    ESOPHAGOGASTRODUODENOSCOPY    PROCEDURE: EGD    SEDATION: Monitored anesthesia care, check anesthesia records    ASA Class: 2    INDICATIONS:  Change in bowel habit and abdominal pain    CONSENT:  Informed consent was obtained for the procedure, including sedation after explaining the risks and benefits of the procedure  Risks including but not limited to bleeding, perforation, infection, and missed lesion  PREPARATION:   Telemetry, pulse oximetry, blood pressure were monitored throughout the procedure  Patient was identified by myself both verbally and by visual inspection of ID band  DESCRIPTION:   Patient was placed in the left lateral decubitus position and was sedated with the above medication  The gastroscope was introduced in to the oropharynx and the esophagus was intubated under direct visualization  Scope was passed down the esophagus up to 2nd part of the duodenum  A careful inspection was made as the gastroscope was withdrawn, including a retroflexed view of the stomach; findings and interventions are described below  FINDINGS:    #1  Esophagus- normal esophagus  No esophagitis  2 cm hiatal hernia noted  GE junction at 39 cm  #2  Stomach- nonerosive gastritis in the antrum and body  Four cold biopsies were taken to rule out H pylori infection    #3  Duodenum- normal bulb and 2nd portion of the duodenum  Random biopsies were taken to rule out celiac disease  IMPRESSIONS:      Small hiatal hernia  Nonerosive gastritis otherwise normal EGD  Biopsies were taken from stomach and duodenum to rule out H pylori infection and celiac disease  RECOMMENDATIONS:     Follow-up biopsy  Colonoscopy to follow    COMPLICATIONS:  None; patient tolerated the procedure well      SPECIMENS: ID Type Source Tests Collected by Time Destination   1 : duodenum bx Tissue Duodenum TISSUE EXAM Pilar Hollis MD 2019 1235    2 : gastric bx Tissue Stomach TISSUE EXAM Pilar Hollis MD 2019 1237        ESTIMATED BLOOD LOSS:  Minimal      SIGNATURE: Pilar Hollis MD  DATE: 2019  TIME: 12:40 PM      OPERATIVE REPORT  PATIENT NAME: Iqra Stuart    :  1980  MRN: [id number]  Pt Location: [facility] ROOM 01    SURGERY DATE: 2019    Surgeon(s) and Role:     * Pilar Hollis MD - Primary    Colonoscopy Procedure Note    Procedure: Colonoscopy    Sedation: Monitored anesthesia care, check anesthesia records      ASA Class: 2    INDICATIONS:  Abdominal pain and change in bowel habit  POST-OP DIAGNOSIS: See the impression below    Procedure Details     Prior colonoscopy: No prior colonoscopy  Informed consent was obtained for the procedure, including sedation  Risks of perforation, hemorrhage, adverse drug reaction and aspiration were discussed  The patient was placed in the left lateral decubitus position  Based on the pre-procedure assessment, including review of the patient's medical history, medications, allergies, and review of systems, he had been deemed to be an appropriate candidate for conscious sedation; he was therefore sedated with the medications listed below  The patient was monitored continuously with telemetry, pulse oximetry, blood pressure monitoring, and direct observations  A rectal examination was performed  The colonoscope was inserted into the rectum and advanced under direct vision to the terminal ileum  The quality of the colonic preparation was good  A careful inspection was made as the colonoscope was withdrawn, including a retroflexed view of the rectum; findings and interventions are described below  Findings:  Normal terminal ileum  Mild diverticulosis in the sigmoid colon  Anastomosis from prior sigmoid surgery noted    The rest of the colon appeared normal   Random biopsies taken to rule out microscopic colitis  The biopsies taken using cold biopsy forceps  Complications: None; patient tolerated the procedure well  Impression:    Mild diverticulosis in the sigmoid colon  Evidence of prior sigmoid resection and anastomosis  Small internal hemorrhoids noted  Random biopsy taken to rule out microscopic colitis    Recommendations:  Repeat colonoscopy in 10 years or sooner if clinically indicated  High-fiber diet  Continue Metamucil  Follow-up biopsy  Follow-up in the office   If you have abdominal pain, bleeding or fever call my office at [phone]  COMPLICATIONS:  None; patient tolerated the procedure well      SPECIMENS:    ID Type Source Tests Collected by Time Destination   1 : duodenum bx Tissue Duodenum TISSUE EXAM Vivian Butts MD 2/25/2019 1235    2 : gastric bx Tissue Stomach TISSUE EXAM Vivian Butts MD 2/25/2019 1237    3 : random colon bx Tissue Colon TISSUE EXAM Vivian Butts MD 2/25/2019 1249        ESTIMATED BLOOD LOSS:  Minimal    SIGNATURE: Vivian Butts MD  DATE: February 25, 2019  TIME: 1:00 PM

## 2019-02-25 NOTE — OP NOTE
OPERATIVE REPORT  PATIENT NAME: Rianna Canchola    :  1980  MRN: [id number]  Pt Location: [facility] ROOM 01    SURGERY DATE: 2019    Surgeon(s) and Role:     * Emanuel Johnson MD - Primary    ESOPHAGOGASTRODUODENOSCOPY    PROCEDURE: EGD    SEDATION: Monitored anesthesia care, check anesthesia records    ASA Class: 2    INDICATIONS:  Change in bowel habit and abdominal pain    CONSENT:  Informed consent was obtained for the procedure, including sedation after explaining the risks and benefits of the procedure  Risks including but not limited to bleeding, perforation, infection, and missed lesion  PREPARATION:   Telemetry, pulse oximetry, blood pressure were monitored throughout the procedure  Patient was identified by myself both verbally and by visual inspection of ID band  DESCRIPTION:   Patient was placed in the left lateral decubitus position and was sedated with the above medication  The gastroscope was introduced in to the oropharynx and the esophagus was intubated under direct visualization  Scope was passed down the esophagus up to 2nd part of the duodenum  A careful inspection was made as the gastroscope was withdrawn, including a retroflexed view of the stomach; findings and interventions are described below  FINDINGS:    #1  Esophagus- normal esophagus  No esophagitis  2 cm hiatal hernia noted  GE junction at 39 cm  #2  Stomach- nonerosive gastritis in the antrum and body  Four cold biopsies were taken to rule out H pylori infection    #3  Duodenum- normal bulb and 2nd portion of the duodenum  Random biopsies were taken to rule out celiac disease  IMPRESSIONS:      Small hiatal hernia  Nonerosive gastritis otherwise normal EGD  Biopsies were taken from stomach and duodenum to rule out H pylori infection and celiac disease  RECOMMENDATIONS:     Follow-up biopsy  Colonoscopy to follow    COMPLICATIONS:  None; patient tolerated the procedure well      SPECIMENS: ID Type Source Tests Collected by Time Destination   1 : duodenum bx Tissue Duodenum TISSUE EXAM Larisa Hart MD 2/25/2019 1235    2 : gastric bx Tissue Stomach TISSUE EXAM Larisa Hart MD 2/25/2019 1237        ESTIMATED BLOOD LOSS:  Minimal      SIGNATURE: Larisa Hart MD  DATE: February 25, 2019  TIME: 12:40 PM

## 2019-02-27 ENCOUNTER — TELEPHONE (OUTPATIENT)
Dept: GASTROENTEROLOGY | Facility: CLINIC | Age: 39
End: 2019-02-27

## 2019-02-27 NOTE — TELEPHONE ENCOUNTER
----- Message from Larisa Hart MD sent at 2/27/2019 11:08 AM EST -----  Please let him know his biopsy from stomach is negative for H pylori infection  Duodenal biopsy negative for celiac disease   Colon biopsy was also completely normal  His symptoms are likely secondary to irritable bowel syndrome

## 2019-07-22 ENCOUNTER — HOSPITAL ENCOUNTER (OUTPATIENT)
Dept: RADIOLOGY | Age: 39
Discharge: HOME | End: 2019-07-22
Attending: OTOLARYNGOLOGY
Payer: COMMERCIAL

## 2019-07-22 DIAGNOSIS — J32.0 CHRONIC MAXILLARY SINUSITIS: ICD-10-CM

## 2019-07-22 PROCEDURE — 70486 CT MAXILLOFACIAL W/O DYE: CPT

## 2019-10-29 ENCOUNTER — APPOINTMENT (OUTPATIENT)
Dept: LAB | Age: 39
End: 2019-10-29
Payer: COMMERCIAL

## 2019-10-29 ENCOUNTER — TRANSCRIBE ORDERS (OUTPATIENT)
Dept: ADMINISTRATIVE | Age: 39
End: 2019-10-29

## 2019-10-29 DIAGNOSIS — J34.2 DEVIATED NASAL SEPTUM: ICD-10-CM

## 2019-10-29 DIAGNOSIS — J32.9 CHRONIC SINUSITIS, UNSPECIFIED LOCATION: Primary | ICD-10-CM

## 2019-10-29 DIAGNOSIS — J34.3 HYPERTROPHY OF NASAL TURBINATES: ICD-10-CM

## 2019-10-29 DIAGNOSIS — J32.9 CHRONIC SINUSITIS, UNSPECIFIED LOCATION: ICD-10-CM

## 2019-10-29 LAB
ANION GAP SERPL CALCULATED.3IONS-SCNC: 5 MMOL/L (ref 4–13)
BASOPHILS # BLD AUTO: 0.03 THOUSANDS/ΜL (ref 0–0.1)
BASOPHILS NFR BLD AUTO: 1 % (ref 0–1)
BUN SERPL-MCNC: 13 MG/DL (ref 5–25)
CALCIUM SERPL-MCNC: 8.8 MG/DL (ref 8.3–10.1)
CHLORIDE SERPL-SCNC: 110 MMOL/L (ref 100–108)
CO2 SERPL-SCNC: 28 MMOL/L (ref 21–32)
CREAT SERPL-MCNC: 0.9 MG/DL (ref 0.6–1.3)
EOSINOPHIL # BLD AUTO: 0.2 THOUSAND/ΜL (ref 0–0.61)
EOSINOPHIL NFR BLD AUTO: 4 % (ref 0–6)
ERYTHROCYTE [DISTWIDTH] IN BLOOD BY AUTOMATED COUNT: 12.1 % (ref 11.6–15.1)
GFR SERPL CREATININE-BSD FRML MDRD: 108 ML/MIN/1.73SQ M
GLUCOSE P FAST SERPL-MCNC: 83 MG/DL (ref 65–99)
HCT VFR BLD AUTO: 41.3 % (ref 36.5–49.3)
HGB BLD-MCNC: 13.5 G/DL (ref 12–17)
IMM GRANULOCYTES # BLD AUTO: 0.02 THOUSAND/UL (ref 0–0.2)
IMM GRANULOCYTES NFR BLD AUTO: 0 % (ref 0–2)
LYMPHOCYTES # BLD AUTO: 1.14 THOUSANDS/ΜL (ref 0.6–4.47)
LYMPHOCYTES NFR BLD AUTO: 22 % (ref 14–44)
MCH RBC QN AUTO: 30.5 PG (ref 26.8–34.3)
MCHC RBC AUTO-ENTMCNC: 32.7 G/DL (ref 31.4–37.4)
MCV RBC AUTO: 93 FL (ref 82–98)
MONOCYTES # BLD AUTO: 0.4 THOUSAND/ΜL (ref 0.17–1.22)
MONOCYTES NFR BLD AUTO: 8 % (ref 4–12)
NEUTROPHILS # BLD AUTO: 3.42 THOUSANDS/ΜL (ref 1.85–7.62)
NEUTS SEG NFR BLD AUTO: 65 % (ref 43–75)
NRBC BLD AUTO-RTO: 0 /100 WBCS
PLATELET # BLD AUTO: 183 THOUSANDS/UL (ref 149–390)
PMV BLD AUTO: 9.2 FL (ref 8.9–12.7)
POTASSIUM SERPL-SCNC: 4 MMOL/L (ref 3.5–5.3)
RBC # BLD AUTO: 4.42 MILLION/UL (ref 3.88–5.62)
SODIUM SERPL-SCNC: 143 MMOL/L (ref 136–145)
WBC # BLD AUTO: 5.21 THOUSAND/UL (ref 4.31–10.16)

## 2019-10-29 PROCEDURE — 80048 BASIC METABOLIC PNL TOTAL CA: CPT

## 2019-10-29 PROCEDURE — 85025 COMPLETE CBC W/AUTO DIFF WBC: CPT

## 2019-10-29 PROCEDURE — 36415 COLL VENOUS BLD VENIPUNCTURE: CPT

## 2020-09-08 ENCOUNTER — OFFICE VISIT (OUTPATIENT)
Dept: FAMILY MEDICINE CLINIC | Facility: CLINIC | Age: 40
End: 2020-09-08
Payer: COMMERCIAL

## 2020-09-08 VITALS — BODY MASS INDEX: 25.01 KG/M2 | HEIGHT: 68 IN | WEIGHT: 165 LBS

## 2020-09-08 DIAGNOSIS — N48.9 PENIS SYMPTOM OR SIGN: Primary | ICD-10-CM

## 2020-09-08 PROCEDURE — 3725F SCREEN DEPRESSION PERFORMED: CPT | Performed by: FAMILY MEDICINE

## 2020-09-08 PROCEDURE — 1036F TOBACCO NON-USER: CPT | Performed by: FAMILY MEDICINE

## 2020-09-08 PROCEDURE — 81003 URINALYSIS AUTO W/O SCOPE: CPT | Performed by: FAMILY MEDICINE

## 2020-09-08 PROCEDURE — 99212 OFFICE O/P EST SF 10 MIN: CPT | Performed by: FAMILY MEDICINE

## 2020-09-08 NOTE — PROGRESS NOTES
Virtual Regular Visit      Assessment/Plan:    Problem List Items Addressed This Visit        Other    Penis symptom or sign - Primary    Relevant Orders    POCT urine dip auto non-scope (Completed)    Urinalysis with microscopic    Urine culture        60-year-old male presents today via virtual video telemedicine visit presenting with slight sensation and sensitivity at the tip of the penis noted since Saturday  Patient denies any urinary symptoms or discomfort, blood in the urine, fevers, chills, discharge, abdominal pain or flank pain  Patient does admit to working 2 jobs recently and sweating more  Last sexual activity was 1 month ago  I am uncertain of the etiology and I am not unable to examine him today  I would like patient drop-off a urine sample  Patient may have symptoms of possible cutaneous candidiasis? He may try OTC clotrimazole twice daily to see if this will help  I also would like patient to be further evaluated by Urology, number has been provided  Return parameters discussed  Patient is agreeable with this plan  Reason for visit is discuss sensitivity sensation at the tip of penis  Chief Complaint   Patient presents with    old new    Possible UTI     x 3 days     Virtual Regular Visit        Encounter provider Milly Acuña MD    Provider located at [address] [place] [id number]      Recent Visits  Date Type Provider Dept   09/08/20 Office Visit Milly Acuña  [facility] recent visits within past 7 days and meeting all other requirements     Future Appointments  No visits were found meeting these conditions  Showing future appointments within next 150 days and meeting all other requirements        The patient was identified by name and date of birth   Radha Tyson was informed that this is a telemedicine visit and that the visit is being conducted through [address] [place] [address] and patient was informed that this is not a secure, HIPAA-complaint platform  He agrees to proceed     My office door was closed  No one else was in the room  He acknowledged consent and understanding of privacy and security of the video platform  The patient has agreed to participate and understands they can discontinue the visit at any time  Patient is aware this is a billable service  Hilda Chau is a 45 y o  male presents today via virtual video telemedicine visit presenting with sensitivity over the tip of his penis since last Saturday  Patient denies any urinary frequency, urgency, blood in the urine, lower abdominal pain, flank pain, fevers, chills, discharge  Patient does admit to his urine being more yellow the past 2 days however has increased intake of water and now urine is clear  Patient does admit to working 2 jobs and sweating more  Last sexual activity was approximately 1 month ago  Denies any rashes  HPI     Past Medical History:   Diagnosis Date    IBS (irritable bowel syndrome)        Past Surgical History:   Procedure Laterality Date    ABDOMINAL SURGERY      MS COLONOSCOPY FLX DX W/COLLJ SPEC WHEN PFRMD N/A 2/25/2019    Procedure: COLONOSCOPY;  Surgeon: Beckie Barahona MD;  [facility]: [facility]; Service: Gastroenterology    MS ESOPHAGOGASTRODUODENOSCOPY TRANSORAL DIAGNOSTIC N/A 2/25/2019    Procedure: ESOPHAGOGASTRODUODENOSCOPY (EGD); Surgeon: Beckie Barahona MD;  Location[facility]; Service: Gastroenterology       No current outpatient medications on file  No current facility-administered medications for this visit  Allergies   Allergen Reactions    Other      Seasonal, cats       Review of Systems   Constitutional: Negative for chills and fever  Gastrointestinal: Negative for abdominal pain and constipation  Genitourinary: Negative for discharge, dysuria, flank pain, frequency, hematuria and urgency         Video Exam    Vitals:    09/08/20 1625 Weight: 74 8 kg (165 lb)   Height: 5' 8" (1 727 m)       Physical Exam  Nursing note reviewed  Constitutional:       General: He is not in acute distress  Appearance: Normal appearance  Neurological:      Mental Status: He is alert and oriented to person, place, and time  Psychiatric:         Mood and Affect: Mood normal           I have spent 13 minutes with Patient  today in which greater than 50% of this time was spent in counseling/coordination of care regarding Prognosis, Risks and benefits of tx options, Intructions for management, Patient and family education, Importance of tx compliance, Risk factor reductions and Impressions  [facility]IT [facility] Obdulia Chaudhry acknowledges that he has consented to an online visit or consultation  He understands that the online visit is based solely on information provided by him, and that, in the absence of a face-to-face physical evaluation by the physician, the diagnosis he receives is both limited and provisional in terms of accuracy and completeness  This is not intended to replace a full medical face-to-face evaluation by the physician  Heladio Urban understands and accepts these terms

## 2020-09-09 LAB
SL AMB  POCT GLUCOSE, UA: ABNORMAL
SL AMB LEUKOCYTE ESTERASE,UA: ABNORMAL
SL AMB POCT BILIRUBIN,UA: ABNORMAL
SL AMB POCT BLOOD,UA: ABNORMAL
SL AMB POCT CLARITY,UA: CLEAR
SL AMB POCT COLOR,UA: YELLOW
SL AMB POCT KETONES,UA: ABNORMAL
SL AMB POCT NITRITE,UA: ABNORMAL
SL AMB POCT PH,UA: 0.6
SL AMB POCT SPECIFIC GRAVITY,UA: 1000
SL AMB POCT URINE PROTEIN: ABNORMAL
SL AMB POCT UROBILINOGEN: 0.2

## 2020-09-09 PROCEDURE — 81001 URINALYSIS AUTO W/SCOPE: CPT | Performed by: FAMILY MEDICINE

## 2020-09-09 PROCEDURE — 87086 URINE CULTURE/COLONY COUNT: CPT | Performed by: FAMILY MEDICINE

## 2020-09-10 LAB
BACTERIA UR QL AUTO: NORMAL /HPF
BILIRUB UR QL STRIP: NEGATIVE
CLARITY UR: CLEAR
COLOR UR: YELLOW
GLUCOSE UR STRIP-MCNC: NEGATIVE MG/DL
HGB UR QL STRIP.AUTO: NEGATIVE
KETONES UR STRIP-MCNC: NEGATIVE MG/DL
LEUKOCYTE ESTERASE UR QL STRIP: NEGATIVE
NITRITE UR QL STRIP: NEGATIVE
NON-SQ EPI CELLS URNS QL MICRO: NORMAL /HPF
PH UR STRIP.AUTO: 6 [PH]
PROT UR STRIP-MCNC: NEGATIVE MG/DL
RBC #/AREA URNS AUTO: NORMAL /HPF
SP GR UR STRIP.AUTO: 1 (ref 1–1.03)
UROBILINOGEN UR QL STRIP.AUTO: 0.2 E.U./DL
WBC #/AREA URNS AUTO: NORMAL /HPF

## 2020-09-11 ENCOUNTER — TELEPHONE (OUTPATIENT)
Dept: FAMILY MEDICINE CLINIC | Facility: CLINIC | Age: 40
End: 2020-09-11

## 2020-09-11 LAB — BACTERIA UR CULT: NORMAL

## 2020-09-11 NOTE — RESULT ENCOUNTER NOTE
Please let patient know that his urinalysis is normal   I will await urine culture results and let him know    Thank you

## 2020-09-14 ENCOUNTER — TELEPHONE (OUTPATIENT)
Dept: FAMILY MEDICINE CLINIC | Facility: CLINIC | Age: 40
End: 2020-09-14

## 2020-09-18 ENCOUNTER — OFFICE VISIT (OUTPATIENT)
Dept: PHYSICAL THERAPY | Facility: REHABILITATION | Age: 40
End: 2020-09-18
Payer: COMMERCIAL

## 2020-09-18 DIAGNOSIS — M54.50 MIDLINE LOW BACK PAIN WITHOUT SCIATICA, UNSPECIFIED CHRONICITY: ICD-10-CM

## 2020-09-18 DIAGNOSIS — N48.89 PENILE PAIN: Primary | ICD-10-CM

## 2020-09-18 PROCEDURE — 97161 PT EVAL LOW COMPLEX 20 MIN: CPT | Performed by: PHYSICAL THERAPIST

## 2020-09-18 NOTE — PROGRESS NOTES
PT Evaluation     Today's date: 2020  Patient name: Damian Nevarez  : 1980  MRN: [id number]  Referring provider: Pablo Zuñiga PT  Dx:   Encounter Diagnosis     ICD-10-CM    1  Penile pain  N48 89    2  Midline low back pain without sciatica, unspecified chronicity  M54 5        Start Time: 1115  Stop Time: 1200  Total time in clinic (min): 45 minutes    Assessment  Assessment details: Mr Mallory Valdivia is a 44 yo male presenting with acute onset of penile pain and sensory changes  Symptoms consistent with mild pudendal neuralgia likey due to a weight lifting event in which abnormal movement mechanics and impaired strength contributed to nerve root irritability  He presents with mild deficits in lumbar extension ROM and impaired multifidi motor control of the lumbopelvis  There is likely PF overactivity due to symptoms of penile pain and urinary urgency, combined with lumbopelvic movement coordination deficits  He would benefit from PFM assessment next visit  Initiated treatment of lumbar extension AROM with good tolerance and mild symptom improvement within session  Impairments: abnormal muscle firing, abnormal or restricted ROM and abnormal movement    Symptom irritability: lowUnderstanding of Dx/Px/POC: good   Prognosis: good    Goals  STG  1  Full and painfree lumbar AROM in 2-4 weeks  2  Penile sensitivity return to PLOF in 2-4 weeks  LTG  1  Able to tolerate sitting for 45 minutes for commute without symptoms within 6 weeks  2  Able to engage in sexual activity without penile pain within 6 weeks      Plan  Patient would benefit from: skilled physical therapy  Referral necessary: No  Planned modality interventions: biofeedback, TENS and unattended electrical stimulation  Planned therapy interventions: abdominal trunk stabilization, patient education, postural training, body mechanics training, motor coordination training, joint mobilization, manual therapy, home exercise program, therapeutic activities and therapeutic exercise  Frequency: 1x week  Duration in weeks: 8  Plan of Care beginning date: 9/18/2020  Plan of Care expiration date: 11/13/2020  Treatment plan discussed with: patient        PT Pelvic Floor Subjective:   History of Present Illness:   Penile discomfort (tingling at tip, soreness at shaft) started 2 weeks ago  Intensity is mild  Symptoms are intermittent- best in the morning, worse as the day progresses  Worse with prolonged sitting, ryan with commute to work (35-40 minutes)  Feels increased penile sensitivity and erectile tendency, and reports penile soreness occurring one day after masturbating last week  Abstaining from intercourse until receiving treatment  A few days ago, felt increased pelvic floor pressure and increased penile tingling at end of the day, when walking around [employer]  Took a hot bath and massaged groin and pelvic floor and symptoms improved  Since onset, pain is lightly improving over time  Suspected etiology: Two days prior to onset of symptom, pt returned to the gym for the first time and exercised (weight lifting) heavier than he had been over the last few months-- felt a painfree pop in his low back during a heavy-weight leg press movement on this day  Urinanalysis and culture negative  Denies flu like symptoms  Date of onset: 9/1/2020          Not a recurrent problem   Quality of life: good    Social Support:     Lives with:  Spouse    Relationship status: /committed    Work status: employed full time  Diet and Exercise:    Diet:balanced nutrition    Exercise type: strengthening exercise program and weight training    Exercise frequency: 2-3 times per week  Bladder Function:     Voiding Difficulties positive for: urgency      Voiding Difficulties negative for: frequent urination, hesitancy, straining and incomplete emptying      Voiding Difficulties comments:     Urinary leakage: no urine leakage    Nocturia (episodes per night): 0    Painful urination: No      Fluid Intake Type:  Sports drinks and water    Intake (ounces): Water: 64, Juice: 20,   Bowel Function:     Bowel Function comments:  Longstanding bowel dysfunction (IBS) with pancreatitis, managed now with daily metamucil  Bowel frequency: daily  Sexual Function:     Sexually Active:  Sexually active (denies erectile dysfunction; pain with masturbation in the last 2 weeks-- delayed onset )  Pain:     Current pain ratin    At best pain ratin    At worst pain ratin  Patient Goals:     Patient goals for therapy:  Decreased pain and return to sport/leisure activities      Objective     Concurrent Complaints  Negative for night pain, disturbed sleep, bowel dysfunction and saddle (S4) numbness    Postural Observations  Seated posture: fair  Standing posture: good        Palpation   Left   No palpable tenderness to the erector spinae and lumbar paraspinals  Right   No palpable tenderness to the erector spinae and lumbar paraspinals  Tenderness     Lumbar Spine  No tenderness in the spinous process and facet joint  Left Hip   No tenderness in the PSIS  Right Hip   No tenderness in the PSIS  Neurological Testing     Sensation     Lumbar   Left   Intact: light touch    Right   Intact: light touch    Active Range of Motion     Lumbar   Flexion:  WFL  Extension:  Restriction level: minimal  Left lateral flexion:  WFL  Right lateral flexion:  WFL  Left rotation:  WFL  Right rotation:  [facility]    Joint Play     Hypermobile: L2, L3, L4 and L5     Strength/Myotome Testing     Lumbar   Left   Normal strength  Heel walk: normal  Toe walk: normal    Right   Normal strength  Heel walk: normal  Toe walk: normal    Additional Strength Details  lumbopelvic instability with resisted hip and lower quarter testing  Muscle Activation   Patient able to activate left multifidus and right multifidus     Patient unable to activate left transverse abdominals, left external obliques, left internal obliques, right transverse abdominals, right external obliques and right internal obliques  Additional Muscle Activation Details  Fair multifidi activation; midlumbar rotational moment, and pain, with prone hip ext    Tests     Lumbar   Positive prone instability   Negative sacral thrust  and sacral spring   Left   Negative passive SLR, quadrant and slump test      Right   Negative passive SLR, quadrant and slump test      Left Pelvic Girdle/Sacrum   Negative: thigh thrust and active SLR test      Right Pelvic Girdle/Sacrum   Negative: thigh thrust and active SLR test      Left Hip   Negative FLORES and FADIR  Right Hip   Negative FLORES and FADIR  General Comments:    Lower quarter screen   Hips: unremarkable  Knees: unremarkable  Foot/ankle: unremarkable    Hip Comments   Symptoms of tightness with end range L hip flexion PROM, no deficits in ROM  Pelvic Floor Exam     General Perineum Exam:     General perineum exam comments: Pelvic floor muscle assessment to be completed PRN in future appointments      SMEG Biofeedback   to be assessed next treatment             Precautions: standard  HEP:  9/18: rep EIL, rep EIS    Manuals 9/18            PF exam nv            Lumbar mobs/thrust                                       Neuro Re-Ed             BFB sEMG nv            Multifidi activation nv                                                                             Ther Ex             Prone press up x10 HEP            Standing rep lumbar ext x10 HEP                                                                                          Ther Activity             Seated postural correction nv                         Gait Training                                       Modalities

## 2020-09-29 ENCOUNTER — OFFICE VISIT (OUTPATIENT)
Dept: PHYSICAL THERAPY | Facility: REHABILITATION | Age: 40
End: 2020-09-29
Payer: COMMERCIAL

## 2020-09-29 DIAGNOSIS — M54.50 MIDLINE LOW BACK PAIN WITHOUT SCIATICA, UNSPECIFIED CHRONICITY: ICD-10-CM

## 2020-09-29 DIAGNOSIS — N48.89 PENILE PAIN: Primary | ICD-10-CM

## 2020-09-29 PROCEDURE — 97140 MANUAL THERAPY 1/> REGIONS: CPT | Performed by: PHYSICAL THERAPIST

## 2020-09-29 NOTE — PROGRESS NOTES
Daily Note   Discharged on  due to- called to self-discharge, feeling better  Today's date: 2020  Patient name: Radha Tyson  : 1980  MRN: [id number]  Referring provider: Jonas Ramires PT  Dx:   Encounter Diagnosis     ICD-10-CM    1  Penile pain  N48 89    2  Midline low back pain without sciatica, unspecified chronicity  M54 5        Start Time: 1700  Stop Time: 1745  Total time in clinic (min): 45 minutes    Subjective: Penile sensitivity is less frequently- occurring once or twice a week  Objective: See treatment diary below  PFM exam: external- TTP L ischiocavernosus and R coccygeus, otherwise no TTP; PFM contraction- good elevation mild breath holding able to correct with cueing; good PF excursion with bear down  PF internal exam (rectal): no TTP throughout; good resting tone and normal tissue mobility; normal PF AROM; PERFECT 5/10/10/10  Assessment: Tolerated treatment well  Pelvic floor muscle exam yields no muscular dysfunction and no symptom reproduction, which rules out pelvic floor etiology and further supports lumbosacral referred pain  Responded positively to lumbopelvic thrust with cavitation produced  Patient exhibited good technique with therapeutic exercises which included lumbar multifidi activation and strengthening exercise    Plan: Continue per plan of care        Precautions: standard  HEP:  : rep EIL, rep EIS  : rep EIL, rep EIS- PRN; quad hip hike, quad hip ext- 1x/day    Manuals            PF exam nv 15'           Lumbar mobs/thrust  x1                                     Neuro Re-Ed             BFB sEMG nv            Quad LE ext nv x15 ea           Quad hip hike  x15 ea                                                               Ther Ex             Prone press up x10 HEP HEP           Standing rep lumbar ext x10 HEP HEP                                                                                         Ther Activity Seated postural correction nv                         Gait Training                                       Modalities

## (undated) DEVICE — SINGLE-USE BIOPSY FORCEPS: Brand: RADIAL JAW 4